# Patient Record
Sex: FEMALE | Race: ASIAN | NOT HISPANIC OR LATINO | Employment: FULL TIME | URBAN - METROPOLITAN AREA
[De-identification: names, ages, dates, MRNs, and addresses within clinical notes are randomized per-mention and may not be internally consistent; named-entity substitution may affect disease eponyms.]

---

## 2021-04-20 ENCOUNTER — COMMUNICATION - HEALTHEAST (OUTPATIENT)
Dept: SCHEDULING | Facility: CLINIC | Age: 51
End: 2021-04-20

## 2021-12-16 ENCOUNTER — OFFICE VISIT (OUTPATIENT)
Dept: FAMILY MEDICINE | Facility: CLINIC | Age: 51
End: 2021-12-16
Payer: COMMERCIAL

## 2021-12-16 VITALS
TEMPERATURE: 97.6 F | SYSTOLIC BLOOD PRESSURE: 144 MMHG | HEART RATE: 69 BPM | DIASTOLIC BLOOD PRESSURE: 84 MMHG | RESPIRATION RATE: 20 BRPM | WEIGHT: 144 LBS | OXYGEN SATURATION: 100 %

## 2021-12-16 DIAGNOSIS — R03.0 ELEVATED BLOOD PRESSURE READING WITHOUT DIAGNOSIS OF HYPERTENSION: ICD-10-CM

## 2021-12-16 DIAGNOSIS — Z83.3 FAMILY HISTORY OF DIABETES MELLITUS: ICD-10-CM

## 2021-12-16 DIAGNOSIS — G56.03 BILATERAL CARPAL TUNNEL SYNDROME: ICD-10-CM

## 2021-12-16 DIAGNOSIS — F32.A DEPRESSION, UNSPECIFIED DEPRESSION TYPE: ICD-10-CM

## 2021-12-16 DIAGNOSIS — M79.2 NEUROPATHIC PAIN OF LEFT HAND: ICD-10-CM

## 2021-12-16 DIAGNOSIS — Z00.00 ENCOUNTER FOR MEDICAL EXAMINATION TO ESTABLISH CARE: ICD-10-CM

## 2021-12-16 DIAGNOSIS — M79.2 NEUROPATHIC PAIN OF RIGHT HAND: Primary | ICD-10-CM

## 2021-12-16 LAB
CHOLEST SERPL-MCNC: 206 MG/DL
FASTING STATUS PATIENT QL REPORTED: ABNORMAL
HBA1C MFR BLD: 5.7 % (ref 0–5.6)
HDLC SERPL-MCNC: 38 MG/DL
LDLC SERPL CALC-MCNC: 118 MG/DL
TRIGL SERPL-MCNC: 249 MG/DL

## 2021-12-16 PROCEDURE — 90682 RIV4 VACC RECOMBINANT DNA IM: CPT | Performed by: STUDENT IN AN ORGANIZED HEALTH CARE EDUCATION/TRAINING PROGRAM

## 2021-12-16 PROCEDURE — 80061 LIPID PANEL: CPT | Performed by: STUDENT IN AN ORGANIZED HEALTH CARE EDUCATION/TRAINING PROGRAM

## 2021-12-16 PROCEDURE — 36415 COLL VENOUS BLD VENIPUNCTURE: CPT | Performed by: STUDENT IN AN ORGANIZED HEALTH CARE EDUCATION/TRAINING PROGRAM

## 2021-12-16 PROCEDURE — 90471 IMMUNIZATION ADMIN: CPT | Performed by: STUDENT IN AN ORGANIZED HEALTH CARE EDUCATION/TRAINING PROGRAM

## 2021-12-16 PROCEDURE — 83036 HEMOGLOBIN GLYCOSYLATED A1C: CPT | Performed by: STUDENT IN AN ORGANIZED HEALTH CARE EDUCATION/TRAINING PROGRAM

## 2021-12-16 PROCEDURE — 99204 OFFICE O/P NEW MOD 45 MIN: CPT | Mod: 25 | Performed by: STUDENT IN AN ORGANIZED HEALTH CARE EDUCATION/TRAINING PROGRAM

## 2021-12-16 RX ORDER — GABAPENTIN 300 MG/1
300 CAPSULE ORAL AT BEDTIME
Qty: 30 CAPSULE | Refills: 0 | Status: SHIPPED | OUTPATIENT
Start: 2021-12-16 | End: 2022-01-12

## 2021-12-16 ASSESSMENT — PATIENT HEALTH QUESTIONNAIRE - PHQ9: SUM OF ALL RESPONSES TO PHQ QUESTIONS 1-9: 17

## 2021-12-16 NOTE — LETTER
RETURN TO WORK/SCHOOL FORM    12/16/2021    Re: Mana Lacy  1970      To Whom It May Concern:     Mana Lacy was seen in clinic today.  She may return to work without restrictions after 12/18/21       Frank Raza MD  12/16/2021 3:02 PM

## 2021-12-16 NOTE — LETTER
December 21, 2021      Mana Lacy  [address] [place] [address]        Dear ,    We are writing to inform you of your test results.    Your lipid levesl are elevated at this time, but not to a point where we should start medication for you.  At this time we would recommend adjusting your diet to cut back on fat content and increase your physical activity to help get these numbers improved.  You also do not have diabetes at this time, though the number is slightly elevated and we should likely check it annually.       We will see you at your follow up, we are happy to answer any questions.        Resulted Orders   Hemoglobin A1c   Result Value Ref Range    Hemoglobin A1C 5.7 (H) 0.0 - 5.6 %      Comment:      Normal <5.7%   Prediabetes 5.7-6.4%    Diabetes 6.5% or higher     Note: Adopted from ADA consensus guidelines.   Lipid Profile   Result Value Ref Range    Cholesterol 206 (H) <=199 mg/dL    Triglycerides 249 (H) <=149 mg/dL    Direct Measure HDL 38 (L) >=50 mg/dL      Comment:      HDL Cholesterol Reference Range:     0-2 years:   No reference ranges established for patients under 2 years old  at [facility] for lipid analytes.    2-8 years:  Greater than 45 mg/dL     18 years and older:   Female: Greater than or equal to 50 mg/dL   Male:   Greater than or equal to 40 mg/dL    LDL Cholesterol Calculated 118 <=129 mg/dL    Patient Fasting > 8hrs? Unknown        If you have any questions or concerns, please call the clinic at the number listed above.       Sincerely,      Frank Raza MD

## 2021-12-16 NOTE — PROGRESS NOTES
Preceptor Attestation:   Patient seen, evaluated and discussed with the resident. I have verified the content of the note, which accurately reflects my assessment of the patient and the plan of care.  Supervising Physician:Frank Clarke MD  [facility]
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
## 2021-12-16 NOTE — LETTER
RETURN TO WORK/SCHOOL FORM    12/16/2021    Re: Mana Lacy  1970      To Whom It May Concern:     Mana Lacy was seen in clinic today.  The patient could likely have a work related injury due to overuse.  She may return to work with restrictions on 12/18/21.  The patient should have limited grasping, should not lift weights more than 10 lbs, and should avoid significant repetitive wrist movements without a break.      Frank Raza MD  12/16/2021 3:20 PM

## 2022-01-12 DIAGNOSIS — M79.2 NEUROPATHIC PAIN OF RIGHT HAND: ICD-10-CM

## 2022-01-12 DIAGNOSIS — M79.2 NEUROPATHIC PAIN OF LEFT HAND: ICD-10-CM

## 2022-01-12 RX ORDER — GABAPENTIN 300 MG/1
300 CAPSULE ORAL AT BEDTIME
Qty: 90 CAPSULE | Refills: 3 | Status: SHIPPED | OUTPATIENT
Start: 2022-01-12 | End: 2022-01-20

## 2022-01-20 ENCOUNTER — OFFICE VISIT (OUTPATIENT)
Dept: FAMILY MEDICINE | Facility: CLINIC | Age: 52
End: 2022-01-20
Payer: COMMERCIAL

## 2022-01-20 VITALS
DIASTOLIC BLOOD PRESSURE: 104 MMHG | TEMPERATURE: 97.6 F | OXYGEN SATURATION: 100 % | WEIGHT: 142 LBS | HEART RATE: 50 BPM | SYSTOLIC BLOOD PRESSURE: 164 MMHG

## 2022-01-20 DIAGNOSIS — I10 BENIGN ESSENTIAL HYPERTENSION: ICD-10-CM

## 2022-01-20 DIAGNOSIS — M79.2 NEUROPATHIC PAIN OF RIGHT HAND: Primary | ICD-10-CM

## 2022-01-20 DIAGNOSIS — H43.393 VITREOUS FLOATERS OF BOTH EYES: ICD-10-CM

## 2022-01-20 DIAGNOSIS — M79.2 NEUROPATHIC PAIN OF LEFT HAND: ICD-10-CM

## 2022-01-20 DIAGNOSIS — R73.03 PRE-DIABETES: ICD-10-CM

## 2022-01-20 LAB
ANION GAP SERPL CALCULATED.3IONS-SCNC: 13 MMOL/L (ref 5–18)
BUN SERPL-MCNC: 13 MG/DL (ref 8–22)
CALCIUM SERPL-MCNC: 9.9 MG/DL (ref 8.5–10.5)
CHLORIDE BLD-SCNC: 103 MMOL/L (ref 98–107)
CO2 SERPL-SCNC: 23 MMOL/L (ref 22–31)
CREAT SERPL-MCNC: 0.75 MG/DL (ref 0.6–1.1)
GFR SERPL CREATININE-BSD FRML MDRD: >90 ML/MIN/1.73M2
GLUCOSE BLD-MCNC: 97 MG/DL (ref 70–125)
POTASSIUM BLD-SCNC: 4.5 MMOL/L (ref 3.5–5)
SODIUM SERPL-SCNC: 139 MMOL/L (ref 136–145)

## 2022-01-20 PROCEDURE — 99214 OFFICE O/P EST MOD 30 MIN: CPT | Mod: GC | Performed by: STUDENT IN AN ORGANIZED HEALTH CARE EDUCATION/TRAINING PROGRAM

## 2022-01-20 PROCEDURE — 36415 COLL VENOUS BLD VENIPUNCTURE: CPT | Performed by: STUDENT IN AN ORGANIZED HEALTH CARE EDUCATION/TRAINING PROGRAM

## 2022-01-20 PROCEDURE — 80048 BASIC METABOLIC PNL TOTAL CA: CPT | Performed by: STUDENT IN AN ORGANIZED HEALTH CARE EDUCATION/TRAINING PROGRAM

## 2022-01-20 RX ORDER — LISINOPRIL 10 MG/1
10 TABLET ORAL DAILY
Qty: 30 TABLET | Refills: 0 | Status: SHIPPED | OUTPATIENT
Start: 2022-01-20 | End: 2022-01-20

## 2022-01-20 RX ORDER — PREGABALIN 25 MG/1
25 CAPSULE ORAL 2 TIMES DAILY
Qty: 60 CAPSULE | Refills: 0 | Status: SHIPPED | OUTPATIENT
Start: 2022-01-20 | End: 2023-03-17

## 2022-01-20 RX ORDER — ACETAMINOPHEN 500 MG
1000 TABLET ORAL EVERY 8 HOURS PRN
COMMUNITY
Start: 2022-01-20 | End: 2023-03-17

## 2022-01-20 NOTE — NURSING NOTE
Due to patient being non-English speaking/uses sign language, an  was used for this visit. Only for face-to-face interpretation by an external agency, date and length of interpretation can be found on the scanned worksheet.     name: Lisa  Agency: [employer]  Language: [place]   Telephone number:   Type of interpretation: Face-to-face, spoken

## 2022-01-20 NOTE — PROGRESS NOTES
"     HPI:       Mana Lacy is an otherwise healthy 51yo F presenting for B/L wrist pain and BP check. Wrist pain has mildly improved since last visit. Attributes this mostly to changing positions at work to accommodate for wrist pain. Tapes wrist at work. Does not use braces at work because she finds them inconvenient. Uses brace when she is at home and sleeping. Dislikes gabapentin because it causes numbness of B/L UE and tongue stiffness/thickness. She stopped taking about 2 wks ago. Denies difficulty breathing and hives when she takes gabapentin. Takes OTC tylenol every 8h for wrist pain and this helps temporarily. Explained she is drinking \"moreno tea\" she buys from the [employer] and this has helped with her pain. She has also tried massage and acupuncture, but this has not improved her pain either.     L thumb feels \"stuck\" sometimes. Has trouble pushing her hair out of her face because of this.     Denies HA, dyspnea, and chest pain. States she has no vision changes, but has had poor eyesight for awhile now despite corrective lenses. She last saw the eye doctor 2 years ago. Has difficulty seeing at night. Explains she sees black spots that look like \"little flies\" in her L eye ever day.     Today she is concerned about her elevated BP and the possibility of having diabetes. Denies changes in urination, changes in appetite, recent wt loss. Unsure if she is eating \"healthily.\" Her youngest son is 8yo and she wants to live long and healthily to be there to raise him.     A [employer]  was used for this visit         PMHX:     There is no problem list on file for this patient.      Current Outpatient Medications   Medication Sig Dispense Refill     acetaminophen (TYLENOL) 500 MG tablet Take 2 tablets (1,000 mg) by mouth every 8 hours as needed for mild pain       pregabalin (LYRICA) 25 MG capsule Take 1 capsule (25 mg) by mouth 2 times daily 60 capsule 0       Social History     Socioeconomic History     " Marital status:      Spouse name: Not on file     Number of children: 7     Years of education: Not on file     Highest education level: Not on file   Occupational History     Occupation:    Tobacco Use     Smoking status: Never Smoker     Smokeless tobacco: Never Used   Substance and Sexual Activity     Alcohol use: Never     Drug use: Never     Sexual activity: Not on file   Other Topics Concern     Not on file   Social History Narrative     Not on file     Social Determinants of Health     Financial Resource Strain: Not on file   Food Insecurity: Not on file   Transportation Needs: Not on file   Physical Activity: Not on file   Stress: Not on file   Social Connections: Not on file   Intimate Partner Violence: Not on file   Housing Stability: Not on file        No Known Allergies    No results found for this or any previous visit (from the past 24 hour(s)).           Physical Exam:     Vitals:    01/20/22 1436 01/20/22 1440   BP: (!) 157/100 (!) 164/104   Pulse: 50    Temp: 97.6  F (36.4  C)    TempSrc: Oral    SpO2: 100%    Weight: 64.4 kg (142 lb)      There is no height or weight on file to calculate BMI.    GENERAL APPEARANCE: sitting comfortably, non-distressed, conversely normally   EYES: Eyes grossly normal to inspection, PERRL and EOM intact.  HEENT: Atraumatic, normocephalic  RESP: Normal WOB, chest symmetrical.   CV: RRR.  MS: thumb opposition intact B/L, flexion and extension at wrist intact B/L, inversion and eversion at wrist intact B/L, pos Phalen test, neg Tinel test, pos shake test   SKIN: no suspicious lesions or rashes  NEURO: B/L UE sensation intact, normal strength and tone, mentation appears intact and speech normal  PSYCH: mood and affect normal      Assessment and Plan     (M79.2) Neuropathic pain of right hand  (primary encounter diagnosis)  (M79.2) Neuropathic pain of left hand  Comment: Mildly improved since last visit with use of splint, changes of activities at  "work, and tylenol use. Endorses intermittent R thumb stiffness. Physical exam notable for positive Phalen and shake test. Thumb opposition intact. ROM of hand at wrist intact. Most likely carpal tunnel syndrome. Possible trigger finger.   - Discontinue gabapentin   - Continue acetaminophen (TYLENOL) 500 MG tablet  - Pregabalin (LYRICA) 25 MG capsule for pain control  - Occupational Therapy Referral for splint fitting    (H43.393) Vitreous floaters of both eyes  Comment: Denies vision changes, but has had poor vision for a long time now. Last eye exam 2 years ago. Unable to see well at night. Endorses seeing \"black flies\" in her L eye intermittently.  Plan: Adult Eye Referral    (I10) Benign essential hypertension  Comment: /100 and 164/104 today. Denies SOB, chest pain, and HA/lightheadedness.   - Basic metabolic panel   - Patient does not want to take anti-hypertensives at this time. Wants to try lifestyle changes first. Counseled on low-sodium diet.   - Discussed possibility of taking anti-hypertensives if lifestyle changes aren't effective in reducing BP. Discussed risks of elevated BP, including stroke.          (R73.03) Pre-diabetes  Comment: 12/16/21 A1C 5.7. Patient concerned about developing diabetes in the future.   - AMB Adult Diabetes Educator Referral to discuss lifestyle and diet changes.  - Counseled patient on healthy eating and light exercise   - Discussed metformin as a prophylactic option. Patient does not want at this time.        Options for treatment and follow-up care were reviewed with the patient and/or guardian. Mana Lacy and/or guardian engaged in the decision making process and verbalized understanding of the options discussed and agreed with the final plan.    Joe Priest, MS3 on 1/20/2022 at 4:09 PM    Precepted today with: Royal Dunn MD    Resident/Fellow Attestation   I, Frank Raza, was present with the medical/[employer] student who participated in the service and in " the documentation of the note.  I have verified the history and personally performed the physical exam and medical decision making.  I agree with the assessment and plan of care as documented in the note.      Frank Raza MD  PGY3

## 2022-01-20 NOTE — LETTER
January 24, 2022      Mana Lacy  [address] [place] [address]        Dear ,    We are writing to inform you of your test results.    Your kidney function is normal at this time.  We will see you back at your next visit.  You are welcome to return sooner with any acute questions or concerns.        Resulted Orders   Basic metabolic panel   Result Value Ref Range    Sodium 139 136 - 145 mmol/L    Potassium 4.5 3.5 - 5.0 mmol/L    Chloride 103 98 - 107 mmol/L    Carbon Dioxide (CO2) 23 22 - 31 mmol/L    Anion Gap 13 5 - 18 mmol/L    Urea Nitrogen 13 8 - 22 mg/dL    Creatinine 0.75 0.60 - 1.10 mg/dL    Calcium 9.9 8.5 - 10.5 mg/dL    Glucose 97 70 - 125 mg/dL    GFR Estimate >90 >60 mL/min/1.73m2      Comment:      Effective December 21, 2021 eGFRcr in adults is calculated using the 2021 CKD-EPI creatinine equation which includes age and gender (Vasu et al., NEJM, DOI: 10.1056/TGRCts0322686)       If you have any questions or concerns, please call the clinic at the number listed above.       Sincerely,      Frank Raza MD

## 2022-01-24 NOTE — PROGRESS NOTES
I have personally reviewed the history and examination as documented by Dr. Raza.  I was present during key portions of the visit and agree with the assessment and plan as documented for 52 yr old female with pre-DM, HTN here for BP check, bilateral wrist pain. Pt will try lifestyle changes before we consider meds for BP. Will send to hand therapy for likely carpal tunnel syndrome. Precautions given. Anticipatory guidance given.     Royal Dunn MD  January 24, 2022  10:42 AM

## 2022-01-26 ENCOUNTER — OFFICE VISIT (OUTPATIENT)
Dept: PHARMACY | Facility: CLINIC | Age: 52
End: 2022-01-26
Payer: COMMERCIAL

## 2022-01-26 DIAGNOSIS — R73.03 PRE-DIABETES: Primary | ICD-10-CM

## 2022-01-26 PROCEDURE — 99207 PR NO CHARGE LOS: CPT | Performed by: PHARMACIST

## 2022-01-26 NOTE — PATIENT INSTRUCTIONS
"Recommendations from today's visit:                                                      1. To help blood pressure decrease the amount of salt, fish sauce, hoisin sauce, oyster sauce you use in cooking  2. You can try lower sodium soy sauce as an option too!  3. When choosing beverages, \"diet\" or \"zero sugar\" options can help our blood sugars      It was great to speak with you today!    I value your experience. You may receive a survey via email or text message in the next few days. I would be very thankful for your time in providing feedback.    Pharmacist's contact information:                                                      Please feel free to contact me with any questions or concerns you have.     Ember Ellis  Pharmacist, Certified Diabetes Care and   [facility]  Phone: [phone]  January 26, 2022 at 3:22 PM            "

## 2022-01-26 NOTE — PROGRESS NOTES
"Clinical Pharmacy Consult:                                                    Mana Lacy is a 52 year old female coming in for a clinical pharmacist consult.  She was referred to me from Dr. Raza.      documentation                            Due to patient being non-English speaking/uses sign language, an  was used for this visit. Only for face-to-face interpretation by an external agency, date and length of interpretation can be found on the scanned worksheet.     name: Gurpreet Haskins-Her  Agency: [employer]  Language: [facility]   Telephone number:-[phone]  Type of interpretation: Face-to-face, spoken                                Reason for Consult: diet counseling for new diagnosis of prediabetes    Lab Results   Component Value Date    A1C 5.7 12/16/2021       Discussion: Questions today about blood pressure, blood cholesterol and blood sugar. Wants to know how to eat to improve her general health.     Plan:  1. To help blood pressure decrease the amount of salt, fish sauce, hoisin sauce, oyster sauce you use in cooking  2. You can try lower sodium soy sauce as an option too!  3. When choosing beverages, \"diet\" or \"zero sugar\" options can help our blood sugars    Ember Ellis, Pharm.D., CDCES [facility]  Phone: [phone]  January 26, 2022 at 2:58 PM  "

## 2022-01-31 NOTE — PATIENT INSTRUCTIONS
Referral for :     Ophthalmology- DM exam    LOCATION/PLACE/Provider :    [facility]   DATE & TIME :    Faxed to location, they will call patient   PHONE :     [phone]  FAX :    [phone]  Appointment made by clinic staff/:    Kaylie

## 2023-02-27 ENCOUNTER — TELEPHONE (OUTPATIENT)
Dept: FAMILY MEDICINE | Facility: CLINIC | Age: 53
End: 2023-02-27
Payer: COMMERCIAL

## 2023-02-27 NOTE — TELEPHONE ENCOUNTER
Called patient, no answer. If patient calls back please help schedule for a blood pressure asap with PCP per Ember. Thank you

## 2023-03-17 ENCOUNTER — OFFICE VISIT (OUTPATIENT)
Dept: FAMILY MEDICINE | Facility: CLINIC | Age: 53
End: 2023-03-17
Payer: COMMERCIAL

## 2023-03-17 VITALS
HEART RATE: 51 BPM | BODY MASS INDEX: 24.74 KG/M2 | WEIGHT: 126 LBS | OXYGEN SATURATION: 98 % | TEMPERATURE: 97.9 F | HEIGHT: 60 IN | DIASTOLIC BLOOD PRESSURE: 69 MMHG | SYSTOLIC BLOOD PRESSURE: 117 MMHG | RESPIRATION RATE: 20 BRPM

## 2023-03-17 DIAGNOSIS — Z12.4 CERVICAL CANCER SCREENING: ICD-10-CM

## 2023-03-17 DIAGNOSIS — Z00.00 ROUTINE GENERAL MEDICAL EXAMINATION AT A HEALTH CARE FACILITY: Primary | ICD-10-CM

## 2023-03-17 DIAGNOSIS — Z11.59 NEED FOR HEPATITIS C SCREENING TEST: ICD-10-CM

## 2023-03-17 DIAGNOSIS — E78.2 MIXED HYPERLIPIDEMIA: ICD-10-CM

## 2023-03-17 DIAGNOSIS — R73.03 PRE-DIABETES: ICD-10-CM

## 2023-03-17 DIAGNOSIS — Z11.4 SCREENING FOR HIV (HUMAN IMMUNODEFICIENCY VIRUS): ICD-10-CM

## 2023-03-17 DIAGNOSIS — Z12.31 ENCOUNTER FOR SCREENING MAMMOGRAM FOR BREAST CANCER: ICD-10-CM

## 2023-03-17 PROBLEM — I10 BENIGN ESSENTIAL HYPERTENSION: Status: RESOLVED | Noted: 2022-01-20 | Resolved: 2023-03-17

## 2023-03-17 LAB
ANION GAP SERPL CALCULATED.3IONS-SCNC: 13 MMOL/L (ref 7–15)
BUN SERPL-MCNC: 15.5 MG/DL (ref 6–20)
CALCIUM SERPL-MCNC: 9.9 MG/DL (ref 8.6–10)
CHLORIDE SERPL-SCNC: 106 MMOL/L (ref 98–107)
CHOLEST SERPL-MCNC: 205 MG/DL
CREAT SERPL-MCNC: 0.76 MG/DL (ref 0.51–0.95)
DEPRECATED HCO3 PLAS-SCNC: 22 MMOL/L (ref 22–29)
GFR SERPL CREATININE-BSD FRML MDRD: >90 ML/MIN/1.73M2
GLUCOSE SERPL-MCNC: 87 MG/DL (ref 70–99)
HBA1C MFR BLD: 5.6 % (ref 0–5.6)
HDLC SERPL-MCNC: 48 MG/DL
LDLC SERPL CALC-MCNC: 147 MG/DL
NONHDLC SERPL-MCNC: 157 MG/DL
POTASSIUM SERPL-SCNC: 4.5 MMOL/L (ref 3.4–5.3)
SODIUM SERPL-SCNC: 141 MMOL/L (ref 136–145)
TRIGL SERPL-MCNC: 48 MG/DL

## 2023-03-17 PROCEDURE — 87624 HPV HI-RISK TYP POOLED RSLT: CPT

## 2023-03-17 PROCEDURE — 99396 PREV VISIT EST AGE 40-64: CPT | Mod: GC

## 2023-03-17 PROCEDURE — 83036 HEMOGLOBIN GLYCOSYLATED A1C: CPT

## 2023-03-17 PROCEDURE — 36415 COLL VENOUS BLD VENIPUNCTURE: CPT

## 2023-03-17 PROCEDURE — 87389 HIV-1 AG W/HIV-1&-2 AB AG IA: CPT

## 2023-03-17 PROCEDURE — 80061 LIPID PANEL: CPT

## 2023-03-17 PROCEDURE — 86803 HEPATITIS C AB TEST: CPT

## 2023-03-17 PROCEDURE — 80048 BASIC METABOLIC PNL TOTAL CA: CPT

## 2023-03-17 PROCEDURE — G0145 SCR C/V CYTO,THINLAYER,RESCR: HCPCS

## 2023-03-17 ASSESSMENT — ENCOUNTER SYMPTOMS
NERVOUS/ANXIOUS: 0
JOINT SWELLING: 1
SORE THROAT: 0
PALPITATIONS: 0
CONSTIPATION: 0
ABDOMINAL PAIN: 0
HEARTBURN: 0
HEMATURIA: 0
ARTHRALGIAS: 0
COUGH: 0
DYSURIA: 0
HEMATOCHEZIA: 0
PARESTHESIAS: 0
DIZZINESS: 0
MYALGIAS: 0
BREAST MASS: 0
HEADACHES: 0
NAUSEA: 0
DIARRHEA: 0
SHORTNESS OF BREATH: 0
CHILLS: 0
EYE PAIN: 0
FEVER: 0
FREQUENCY: 0
WEAKNESS: 0

## 2023-03-17 NOTE — PROGRESS NOTES
I have personally reviewed the history and examination as documented by Dr. Armas.  I was present during key portions of the visit and agree with the assessment and plan as documented for 53 yr old female with pre-DM here for pap, health maintenance and labwork. Pt declined colonoscopy but agreed to mammogram. Precautions given. Anticipatory guidance given.     Royal Dunn MD  March 17, 2023  9:08 AM

## 2023-03-17 NOTE — PROGRESS NOTES
SUBJECTIVE:   CC: Mana is an 53 year old who presents for preventive health visit.   Patient has been advised of split billing requirements and indicates understanding: Yes  HPI    Health care changes since last visit:  - No concerns today  - Was seen for right wrist pain last year; has changed some of the roles at her job and incorporated massage, which she thinks helped  - Any new health concerns: No   - Any new hospitalizations:  No   - Any new surgeries: No   - Any medication changes: Not currently on any medications     Lifestyle:  - Diet: Eats consistent meals, eats vegetables in most meals. Eats typical [employer] diet.   - Exercise: Enrolled in a gym.   - Alcohol: No  - Tobacco: No   - Drug use: No   - Periods: stopped completely two years ago, has not experienced any bleeding since that time.     Health Maintenance/Preventitive Measures:  - Vaccinations: Declines today   - Colon cancer screening: Declines today; will think about it more next time.   - Breast cancer screening: Accepts screening, will be first mammogram.  - Pap smear: Patient has not had a mammogram since the birth of her last child. Accepts screening today    Laboratory Tests:  - Cholesterol: was elevated two years ago - will retest today   - Glucose/A1c: Was 5.7 two years ago - will retest today   - Agrees to screening HIV and Hepatitis C test    Today's PHQ-2 Score:   PHQ-2 ( 1999 Pfizer) 3/17/2023   Q1: Little interest or pleasure in doing things 0   Q2: Feeling down, depressed or hopeless 0   PHQ-2 Score 0   Q1: Little interest or pleasure in doing things Not at all   Q2: Feeling down, depressed or hopeless Not at all   PHQ-2 Score 0     Social History     Tobacco Use     Smoking status: Never     Smokeless tobacco: Never   Substance Use Topics     Alcohol use: Never     Alcohol Use 3/17/2023   Prescreen: >3 drinks/day or >7 drinks/week? No       Past Medical History:   Diagnosis Date     FH: cholecystectomy       No past surgical history on  file.    Review of Systems   Constitutional: Negative for chills and fever.   HENT: Negative for congestion, ear pain, hearing loss and sore throat.    Eyes: Negative for pain and visual disturbance.   Respiratory: Negative for cough and shortness of breath.    Cardiovascular: Negative for chest pain, palpitations and peripheral edema.   Gastrointestinal: Negative for abdominal pain, constipation, diarrhea, heartburn, hematochezia and nausea.   Breasts:  Negative for tenderness, breast mass and discharge.   Genitourinary: Negative for dysuria, frequency, genital sores, hematuria, pelvic pain, urgency, vaginal bleeding and vaginal discharge.   Musculoskeletal: Positive for joint swelling. Negative for arthralgias and myalgias.   Skin: Negative for rash.   Neurological: Negative for dizziness, weakness, headaches and paresthesias.   Psychiatric/Behavioral: Negative for mood changes. The patient is not nervous/anxious.      OBJECTIVE:   /69   Pulse 51   Temp 97.9  F (36.6  C)   Resp 20   Ht 1.524 m (5')   Wt 57.2 kg (126 lb)   SpO2 98%   BMI 24.61 kg/m    Physical Exam  GENERAL: healthy, alert and no distress  EYES: Eyes grossly normal to inspection, PERRL and conjunctivae and sclerae normal  HENT: ear canals and TM's normal, nose and mouth without ulcers or lesions  NECK: no adenopathy, no asymmetry, masses, or scars and thyroid normal to palpation  RESP: lungs clear to auscultation - no rales, rhonchi or wheezes  CV: regular rate and rhythm, normal S1 S2, no S3 or S4, no murmur, click or rub, no peripheral edema and peripheral pulses strong  ABDOMEN: soft, nontender, no hepatosplenomegaly, no masses and bowel sounds normal   (female): normal female external genitalia, normal urethral meatus, vaginal mucosa pink, moist, well rugated, and normal cervix/adnexa/uterus without masses or discharge  MS: no gross musculoskeletal defects noted, no edema  SKIN: no suspicious lesions or rashes  NEURO: Normal  strength and tone, mentation intact and speech normal  PSYCH: mentation appears normal, affect normal/bright    ASSESSMENT/PLAN:     Routine general medical examination at a health care facility  (primary encounter diagnosis)  Comment: No concerns today. No recent changes to health, not currently on any medications.   Plan: Routine visit in one year.     Pre-diabetes  Mixed hyperlipidemia  Comment: A1c was 5.7 in 2021. Not currently on any medications. Has started exercising more, feels like her diet is balanced and relatively healthy.   Plan:   - Hemoglobin A1c  - Basic metabolic panel  - Lipid panel    Screening for HIV (human immunodeficiency virus)  Comment: Agrees to routine screening.   Plan: HIV Screening          Need for hepatitis C screening test  Comment: Agrees to routine screening.   Plan: Hepatitis C Screen Reflex to HCV RNA Quant and Genotype    Encounter for screening mammogram for breast cancer  Comment: Agrees to routine screening.   Plan: MA SCREENING DIGITAL BILAT    Cervical cancer screening  Comment: Patient has never had an abnormal pap. Last exam was over 10 years ago. Agrees to routine screening today.   Plan: Gynecologic Cytology (PAP)        COUNSELING:  Reviewed preventive health counseling, as reflected in patient instructions  Special attention given to:        Regular exercise       Healthy diet/nutrition       Alcohol Use       Osteoporosis prevention/bone health       Colorectal Cancer Screening       Consider Hep C screening for all patients one time for ages 18-79 years       HIV screeninx in teen years, 1x in adult years, and at intervals if high risk       (Hannah)menopause management    She reports that she has never smoked. She has never used smokeless tobacco.    Lola Taveras MD  [facility]

## 2023-03-17 NOTE — LETTER
March 28, 2023      Mana Lacy  [address] [place] [address]        Dear ,    We are writing to inform you of your test results.    At your physical, we obtained routine screening lab.     1. Your cholesterol came back elevated, but is not so elevated that we need to start a medication to treat this. We will continue to monitor your cholesterol levels. Please let us known if you have more questions on this.     2.  Your HIV and hepatitis C screening returned as negative, meaning you do not have either of these diseases.     3. Your electrolytes and kidney function were normal. Your diabetes test also came back normal.     4. Your pap smear came back normal. You will be due for your next in five years.     Lola Taveras MD        Resulted Orders   Gynecologic Cytology (PAP)   Result Value Ref Range    Interpretation        Negative for Intraepithelial Lesion or Malignancy (NILM)    Comment         Papanicolaou Test Limitations:  Cervical cytology is a screening test with limited sensitivity, and regular screening is critical for cancer prevention.  Pap tests are primarily effective for the diagnosis/prevention of squamous cell carcinoma, not adenocarcinoma or other cancers.        Specimen Adequacy       Satisfactory for evaluation, endocervical/transformation zone component present    Clinical Information       none      Reflex Testing Yes regardless of result     Previous Abnormal?       No      Performing Labs       The technical component of this testing was completed at [facility]     HIV Screening   Result Value Ref Range    HIV Antigen Antibody Combo Nonreactive Nonreactive      Comment:      HIV-1 p24 Ag & HIV-1/HIV-2 Ab Not Detected   Hepatitis C Screen Reflex to HCV RNA Quant and Genotype   Result Value Ref Range    Hepatitis C Antibody Nonreactive Nonreactive    Narrative    Assay performance characteristics have not been  established for newborns, infants, and children.   Lipid panel   Result Value Ref Range    Cholesterol 205 (H) <200 mg/dL    Triglycerides 48 <150 mg/dL    Direct Measure HDL 48 (L) >=50 mg/dL    LDL Cholesterol Calculated 147 (H) <=100 mg/dL    Non HDL Cholesterol 157 (H) <130 mg/dL    Narrative    Cholesterol  Desirable:  <200 mg/dL    Triglycerides  Normal:  Less than 150 mg/dL  Borderline High:  150-199 mg/dL  High:  200-499 mg/dL  Very High:  Greater than or equal to 500 mg/dL    Direct Measure HDL  Female:  Greater than or equal to 50 mg/dL   Male:  Greater than or equal to 40 mg/dL    LDL Cholesterol  Desirable:  <100mg/dL  Above Desirable:  100-129 mg/dL   Borderline High:  130-159 mg/dL   High:  160-189 mg/dL   Very High:  >= 190 mg/dL    Non HDL Cholesterol  Desirable:  130 mg/dL  Above Desirable:  130-159 mg/dL  Borderline High:  160-189 mg/dL  High:  190-219 mg/dL  Very High:  Greater than or equal to 220 mg/dL   Hemoglobin A1c   Result Value Ref Range    Hemoglobin A1C 5.6 0.0 - 5.6 %      Comment:      Normal <5.7%   Prediabetes 5.7-6.4%    Diabetes 6.5% or higher     Note: Adopted from ADA consensus guidelines.   Basic metabolic panel   Result Value Ref Range    Sodium 141 136 - 145 mmol/L    Potassium 4.5 3.4 - 5.3 mmol/L    Chloride 106 98 - 107 mmol/L    Carbon Dioxide (CO2) 22 22 - 29 mmol/L    Anion Gap 13 7 - 15 mmol/L    Urea Nitrogen 15.5 6.0 - 20.0 mg/dL    Creatinine 0.76 0.51 - 0.95 mg/dL    Calcium 9.9 8.6 - 10.0 mg/dL    Glucose 87 70 - 99 mg/dL    GFR Estimate >90 >60 mL/min/1.73m2      Comment:      eGFR calculated using 2021 CKD-EPI equation.   HPV High Risk Types DNA Cervical   Result Value Ref Range    Other HR HPV Negative Negative    HPV16 DNA Negative Negative    HPV18 DNA Negative Negative    FINAL DIAGNOSIS       This patient's sample is negative for HPV DNA.        This test was developed and its performance characteristics determined by the [facility]. It has not been cleared or approved by the FDA. The laboratory is regulated under CLIA as qualified to perform high-complexity testing. This test is used for clinical purposes. It should not be regarded as investigational or for research.    METHODOLOGY: The Roche Marley 4800 system uses automated extraction, simultaneous amplification of HPV (L1 region) and beta-globin, followed by real time detection of fluorescent labeled HPV and beta globin using specific oligonucleotide probes. The test specifically identifies types HPV 16 DNA and HPV 18 DNA while concurrently detecting the rest of the high risk types (31, 33, 35, 39, 45, 51, 52, 56, 58, 59, 66 or 68).    COMMENTS: This test is not intended for use as a screening device for woman under age 30 with normal cervical cytology. Results should be correlated with cytologic and histologic findings. Close clinical followup is recommended.           If you have any questions or concerns, please call the clinic at the number listed above.       Sincerely,      Royal Dunn MD

## 2023-03-18 LAB
HCV AB SERPL QL IA: NONREACTIVE
HIV 1+2 AB+HIV1 P24 AG SERPL QL IA: NONREACTIVE

## 2023-03-22 LAB
BKR LAB AP GYN ADEQUACY: NORMAL
BKR LAB AP GYN INTERPRETATION: NORMAL
BKR LAB AP HPV REFLEX: NORMAL
BKR LAB AP PREVIOUS ABNORMAL: NORMAL
PATH REPORT.COMMENTS IMP SPEC: NORMAL
PATH REPORT.COMMENTS IMP SPEC: NORMAL
PATH REPORT.RELEVANT HX SPEC: NORMAL

## 2023-03-24 LAB
HUMAN PAPILLOMA VIRUS 16 DNA: NEGATIVE
HUMAN PAPILLOMA VIRUS 18 DNA: NEGATIVE
HUMAN PAPILLOMA VIRUS FINAL DIAGNOSIS: NORMAL
HUMAN PAPILLOMA VIRUS OTHER HR: NEGATIVE

## 2025-05-08 ENCOUNTER — OFFICE VISIT (OUTPATIENT)
Dept: FAMILY MEDICINE | Facility: CLINIC | Age: 55
End: 2025-05-08
Payer: COMMERCIAL

## 2025-05-08 ENCOUNTER — TELEPHONE (OUTPATIENT)
Dept: FAMILY MEDICINE | Facility: CLINIC | Age: 55
End: 2025-05-08

## 2025-05-08 VITALS
BODY MASS INDEX: 25.91 KG/M2 | HEIGHT: 60 IN | HEART RATE: 59 BPM | OXYGEN SATURATION: 97 % | SYSTOLIC BLOOD PRESSURE: 137 MMHG | DIASTOLIC BLOOD PRESSURE: 82 MMHG | TEMPERATURE: 98.6 F | RESPIRATION RATE: 19 BRPM | WEIGHT: 132 LBS

## 2025-05-08 DIAGNOSIS — Z00.00 ROUTINE GENERAL MEDICAL EXAMINATION AT A HEALTH CARE FACILITY: Primary | ICD-10-CM

## 2025-05-08 DIAGNOSIS — Z12.31 ENCOUNTER FOR SCREENING MAMMOGRAM FOR BREAST CANCER: ICD-10-CM

## 2025-05-08 DIAGNOSIS — Z23 NEED FOR DIPHTHERIA-TETANUS-PERTUSSIS (TDAP) VACCINE: ICD-10-CM

## 2025-05-08 DIAGNOSIS — Z12.11 SCREEN FOR COLON CANCER: ICD-10-CM

## 2025-05-08 DIAGNOSIS — R73.03 PRE-DIABETES: ICD-10-CM

## 2025-05-08 DIAGNOSIS — Z23 NEED FOR PNEUMOCOCCAL VACCINATION: ICD-10-CM

## 2025-05-08 PROBLEM — M79.2 NEUROPATHIC PAIN OF RIGHT HAND: Status: RESOLVED | Noted: 2022-01-20 | Resolved: 2025-05-08

## 2025-05-08 PROBLEM — M79.2 NEUROPATHIC PAIN OF LEFT HAND: Status: RESOLVED | Noted: 2022-01-20 | Resolved: 2025-05-08

## 2025-05-08 LAB
EST. AVERAGE GLUCOSE BLD GHB EST-MCNC: 126 MG/DL
HBA1C MFR BLD: 6 % (ref 0–5.6)

## 2025-05-08 SDOH — HEALTH STABILITY: PHYSICAL HEALTH: ON AVERAGE, HOW MANY MINUTES DO YOU ENGAGE IN EXERCISE AT THIS LEVEL?: 30 MIN

## 2025-05-08 SDOH — HEALTH STABILITY: PHYSICAL HEALTH: ON AVERAGE, HOW MANY DAYS PER WEEK DO YOU ENGAGE IN MODERATE TO STRENUOUS EXERCISE (LIKE A BRISK WALK)?: 3 DAYS

## 2025-05-08 ASSESSMENT — SOCIAL DETERMINANTS OF HEALTH (SDOH): HOW OFTEN DO YOU GET TOGETHER WITH FRIENDS OR RELATIVES?: ONCE A WEEK

## 2025-05-08 NOTE — PROGRESS NOTES
Faculty Supervision of Residents   I have examined this patient and the medical care has been evaluated and discussed with the resident. See resident note outlining our discussion. Reviewed all USPSTF A and B recs    Bee Salcedo MD

## 2025-05-08 NOTE — PROGRESS NOTES
Preventive Care Visit  [facility]  Lora Bynum MD, Family Medicine  May 8, 2025      Assessment & Plan     Routine general medical examination at a health care facility  55 year old otherwise healthy female who presents for yearly physical. No new concerns and not taking any medications. Discussed healthy lifestyle recommendations including diet and exercise goals.     Pre-diabetes  A1c was 5.7 3 years ago, was 5.6 in 2023. Will repeat screening today.   - Hemoglobin A1c    Encounter for screening mammogram for breast cancer  - MA Screening Bilateral w/ Avi    Screen for colon cancer  Discussed options and agreeable to FIT testing.   - Fecal colorectal cancer screen FIT    Need for diphtheria-tetanus-pertussis (Tdap) vaccine  - TDAP 10-64Y (ADACEL,BOOSTRIX)    Need for pneumococcal vaccination  - Pneumococcal 20 Valent Conjugate (PCV20)    Encouraged to get Shingles vaccine at Pharmacy.     Will try to obtain Hepatitis B records. If unable to locate prior lab immunity or vaccination will plan to order hepatitis B screening panel prior to vaccination.     BMI  Estimated body mass index is 25.78 kg/m  as calculated from the following:    Height as of this encounter: 1.524 m (5').    Weight as of this encounter: 59.9 kg (132 lb).       Counseling  Appropriate preventive services were addressed with this patient via screening, questionnaire, or discussion as appropriate for fall prevention, nutrition, physical activity, Tobacco-use cessation, social engagement, weight loss and cognition.  Checklist reviewing preventive services available has been given to the patient.  Reviewed patient's diet, addressing concerns and/or questions.   She is at risk for lack of exercise and has been provided with information to increase physical activity for the benefit of her well-being.     Follow-up  Return in about 53 weeks (around 5/14/2026) for Annual Wellness Visit.    Subjective   Mana is a 55 year  old, presenting for the following:  Physical        5/8/2025     2:13 PM   Additional Questions   Roomed by Jason   Accompanied by KRISTIN         5/8/2025    Information    services provided? Yes   Language Hmong   Type of interpretation provided Face-to-face    name Lisa    ID KRISTIN    Ag[employer]    phone number [phone]      HPI    Concerns:  1) About a year of intermittent numbness in the left fourth toe. Initially thought was related to socks/shoes she was wearing.      Advance Care Planning    Discussed advance care planning with patient; informed AVS has link to [employer].        5/8/2025   General Health   How would you rate your overall physical health? Good   Feel stress (tense, anxious, or unable to sleep) Only a little   (!) STRESS CONCERN        5/8/2025   Nutrition   Three or more servings of calcium each day? Yes   Diet: Regular (no restrictions)   How many servings of fruit and vegetables per day? (!) 0-1   How many sweetened beverages each day? 0-1      Drinks mostly water. Eats traditional  diet.          5/8/2025   Exercise   Days per week of moderate/strenous exercise 3 days   Average minutes spent exercising at this level 30 min     Walking and works in garden.         5/8/2025   Social Factors   Frequency of gathering with friends or relatives Once a week   Worry food won't last until get money to buy more No   Food not last or not have enough money for food? Yes   Do you have housing? (Housing is defined as stable permanent housing and does not include staying outside in a car, in a tent, in an abandoned building, in an overnight shelter, or couch-surfing.) Yes   Are you worried about losing your housing? Yes   Lack of transportation? Yes   Unable to get utilities (heat,electricity)? Yes   Want help with housing or utility concern? No   (!) FOOD SECURITY CONCERN PRESENT (!) TRANSPORTATION CONCERN PRESENT(!) HOUSING  CONCERN PRESENT(!) FINANCIAL RESOURCE STRAIN CONCERN    Right now feels things are okay but worried about changes with the current administration. Worries about the cost of food rising without income increase. Discussed and offered support through care coordination/SW if desired. She will reach out if needed.         5/8/2025   Fall Risk   Fallen 2 or more times in the past year? No   Trouble with walking or balance? No          5/8/2025   Dental   Dentist two times every year? Yes     Today's PHQ-2 Score:       5/8/2025     2:07 PM   PHQ-2 ( 1999 Pfizer)   Q1: Little interest or pleasure in doing things 0   Q2: Feeling down, depressed or hopeless 0   PHQ-2 Score 0    Q1: Little interest or pleasure in doing things Not at all   Q2: Feeling down, depressed or hopeless Not at all   PHQ-2 Score 0       Patient-reported     Not feeling overly anxious right now.  had an eye issue so is not working currently which worries her though she is hopeful he will return to work.         5/8/2025   Substance Use   Alcohol more than 3/day or more than 7/wk No   Do you use any other substances recreationally? No     Social History     Tobacco Use    Smoking status: Never     Passive exposure: Never    Smokeless tobacco: Never   Substance Use Topics    Alcohol use: Never    Drug use: Never          Mammogram Screening - Mammogram every 1-2 years updated in Health Maintenance based on mutual decision making        5/8/2025   STI Screening   New sexual partner(s) since last STI/HIV test? No     Last period a few years ago during the pandemic. Menopausal.   One male partner. No concern for STIs.     History of abnormal Pap smear: No - age 30-64 HPV with reflex Pap every 5 years recommended        Latest Ref Rng & Units 3/17/2023     9:14 AM   PAP / HPV   PAP  Negative for Intraepithelial Lesion or Malignancy (NILM)    HPV 16 DNA Negative Negative    HPV 18 DNA Negative Negative    Other HR HPV Negative Negative      ASCVD Risk    The 10-year ASCVD risk score (Radha FAITH, et al., 2019) is: 2.9%    Values used to calculate the score:      Age: 55 years      Sex: Female      Is Non- : No      Diabetic: No      Tobacco smoker: No      Systolic Blood Pressure: 137 mmHg      Is BP treated: No      HDL Cholesterol: 48 mg/dL      Total Cholesterol: 205 mg/dL           Reviewed and updated as needed this visit by Provider   Tobacco  Allergies  Meds  Problems  Med Hx  Surg Hx  Fam Hx  Soc   Hx Sexual Activity                 Objective    Exam  /82   Pulse 59   Temp 98.6  F (37  C)   Resp 19   Ht 1.524 m (5')   Wt 59.9 kg (132 lb)   SpO2 97%   BMI 25.78 kg/m     Estimated body mass index is 25.78 kg/m  as calculated from the following:    Height as of this encounter: 1.524 m (5').    Weight as of this encounter: 59.9 kg (132 lb).    Physical Exam  GENERAL: alert and no distress  EYES: Eyes grossly normal to inspection, PERRL and conjunctivae and sclerae normal  HENT: Left ear canal and TM normal, right with small erythematous lesion; nose and mouth without ulcers or lesions  NECK: no adenopathy, no asymmetry, masses, or scars  RESP: lungs clear to auscultation - no rales, rhonchi or wheezes  CV: regular rate and rhythm, normal S1 S2, no S3 or S4, no murmur, click or rub, no peripheral edema  ABDOMEN: soft, nontender, no hepatosplenomegaly, no masses and bowel sounds normal  MS: no gross musculoskeletal defects noted, no edema  SKIN: no suspicious lesions or rashes  NEURO: Normal strength and tone, mentation intact and speech normal  PSYCH: mentation appears normal, affect normal/bright    Foot exam: normal DP and PT pulses, no trophic changes or ulcerative lesions, normal sensory exam (temperature, proprioception, light touch), normal monofilament exam.     Signed Electronically by: Lora Bynum MD

## 2025-05-08 NOTE — PATIENT INSTRUCTIONS
"Patient Education   Amelia Amparo Xeeb Saib Xyuas Kom Tiv Thaiv Tus Kheej  Nov yog ib co amelia amparo xeeb uas peb yeej meem muab ashley tib neeg pab lawv noj qab nyob zoo. Jaci zaum koj pab pawg saib xyuas yuav muaj ib co amelia amparo xeeb uas tshwj xeeb ashley koj nkaus xwb. Thov nrog koj pab pawg saib xyuas sib rishi txog jaci yam uas koj toob ariana kom tiv thaiv koj tus kheej.  Tanmay Coj Lub Neej  Es xaws xais ntau tami 150 feeb txhua lub schaefer tiam (30 feeb txhua hnub, 5 hnub ib lub schaefer tiam).  Ua yam pab cov leeg muaj zog tuaj 2 zaug txhua lub schaefer tiam. Jaci yam no pab koj tswj hwm koj lub cev qhov hnyav thiab tiv thaiv ntawm kab mob.  Txhob haus luam yeeb.  Pleev tshuaj tiv thaiv tshav kub kom thiaj tsis raug mob khees xaws ntawm nqaij tawv.  Txhua 2 mus ashley 4 xyoos yuav tau kuaj koj lub tsev ashley qhov radon. Radon yog ib james kobe uas tsis muaj xim tsis muaj ntxhiab uas mob tau koj cov ntsws. Kom thiaj kawm ntxiv, mus saib [email or web address] thiab ntaus ntawv \"Radon in Homes.\"  Ceev cov phom kom tsis muaj mos txwv ashley hauv thiab muab xauv capri hauv ib qho chaw nyab xeeb xws li lub txhoj, los yog muab xauv capri thiab muab cov yawm sij zais capri. Muab cov mos txwv xauv ashley hauv lwm qhov chaw nrug cov phom. Kom thiaj kawm ntxiv, mus saib [email or web address] thiab ntaus ntawv \"safe gun storage.\"  Tanmay Noj Qab Huv  Noj 5 qho txiv hmab txiv ntoo thiab zaub txhua hnub.  Noj nplem nplej, txhuv xim av thiab cov fawm muaj nplej (los theej nplem dawb, txhuv dawb, thiab fawm dawb).  Ua tib zoo noj calcium thiab vitamin D ntau. Saib cov ntawv lo ashley pob khoom noj thiab siv zog noj kom txog 100% RDA (qhov ntau hauv ib hnub).  Yeej meem kuaj ntsuas  Txhua 6 lub hli mus kuaj hniav thiab muab tu.  Txhua xyoo mus saib koj pab pawg saib xyuas tanmay noj qab nyob zoo kom sib rishi txog:  Ib yam dab tsi uas hloov ntawm koj txoj tanmay noj qab nyob zoo.  Cov tshuaj uas koj pab pawg tau hais kom siv.  Tanmay saib xyuas kom tiv thaiv, tanmay npaj ashley tsev neeg, thiab yuav ua li harlan tiv " thaiv ntawm kab mob uas ntev.  Tanmay txhaj tshuaj (koob tshuaj tiv thaiv)   Cov koob tshuaj HPV (txog thaum muaj 25 xyoo), yog koj yeej tsis tau txais tami li.  Cov koob tshuaj Hepatitis B Kab Mob Siab (txog thaum muaj 58 xyoos), yog koj yeej tsis tau txais tmai li.  Koob tshuaj COVID-19: Txais koob tshuaj no thaum txog caij txais.  Koob tshuaj tiv thaiv ntawm mob khaub thuas: Txais txhua xyoo.  Koob tshuaj tiv thaiv mob daig tsaig: Txais txhua 10 xyoo.  Pneumococcal, hepatitis A, thiab RSV cov koob tshuaj: Nug koj pab pawg saib xyuas devonte puas tsim nyog ashley koj txais cov no raws li koj qhov pheej hmoo.  Koob tshuaj tiv thaiv ntawm kab mob sawv hlwv (ashley cov muaj 49 xyoo rov jolanta).  Tanmay kuaj ntsuas ashley tanmay noj qab nyob zoo  Kuaj mob ntshav qab zib:  Pib thaum muaj 34 xyoos, Mus kuaj mob ntshav qab zib txhua 2 xyoos los yog ntau zog.  Yog koj tseem tsis tau txog 34 xyoos, nug koj pab pawg saib xyuas devonte puas tsim nyog ashley koj kuaj mob ntshav qab zib.  Kuaj cholesterol: Thaum muaj 38 xyoo, pib kuaj cholesterol txhua 4 xyoos, los yog ntau tami ntawd yog kws mckenna mob qhia.  Kuaj txha qhov tuab (DEXA): Thaum txog 49 xyoo lawd, nug koj pab pawg saib xyuas devonte puas tsim nyog ashley koj kuaj txha devonte puas ruaj.  Kab Mob Siab Hepatitis C: Kuaj ib zaug hauv koj lub neej.  Kuaj Txoj Hlab Ntshav Hauv Plab: Nrog koj tus kws mckenna mob rishi txog tanmay kuaj ntsuas no yog koj:  Tau haus luam yeeb ib zaug li; thiab  Yog txiv neej; thiab  Nruab hnub nyoog 64 thiab 74.  Mob Ariana Cees (kis mob dhau ntawm tanmay sib deev)  Ua ntej muaj 23 xyoos: Nug koj pab pawg saib xyuas devonte puas tsim nyog kuaj ashley mob ariana cees.  Rich qab muaj 23 xyoos: Mus kuaj ashley mob ariana cees yog koj muaj tanmay pheej hmoo. Koj muaj tanmay pheej hmoo ashley mob ariana cees (suav nrog HIV) yog:  Koj sib deev nrog ntau tami ib tug neeg.  Koj tsis siv cov hnab looj qau thaum sib deev.  Koj los yog koj tus khub deev kuaj pom tias muaj mob ariana cees lawm.  Yog koj muaj tanmay pheej hmoo ashley mob ariana cees  HIV, nug txog cov tshuaj PrEP kom tiv thaiv ntawm HIV.  Mus kuaj ashley mob ariana cees HIV yam ntau tami ib zaug hauv koj lub neej, txawm yog koj muaj tanmay pheej hmoo ashley mob ariana cees HIV los tsis muaj los xij.  Kuaj ashley mob khees xaws  Kuaj lub ncauj tsev me nyuam ashley mob khees xaws: Yog koj muaj ib lub ncauj tsev me nyuam, marixa yuav tau ib sij kuaj lub ncauj tsev me nyuam seb puas muaj mob khees xaws pib thaum muaj 20 xyoos. Neeg feem coob uas ib sij kuaj lub ncauj tsev me nyuam thiab tsis pom dab tsi txawv lawv tsum tau jorge qab muaj 64 xyoos. Nrog koj tus kws mckenna mob sib rishi txog qhov no.  Kuaj lub mis ashley mob khees xaws (mammogram): Yog koj tau muaj mis tami li, yuav tau ib sij kuaj lub mis pib thaum muaj 39 xyoo. Tanmay kuaj no yog kom saib seb puas muaj mob khees xaws hauv lub mis.  Kuaj Txoj Hnyuv Ashley Mob Khees Xaws: Yeej tseem ceeb pib kuaj txoj hnyuv ashley mob khees xaws pib thaum muaj 44 xyoos.  Txhua 9 xyoo yuav tau kuaj txoj hnyuv (los yog ntau zog yog koj muaj tanmay pheej hmoo) Los sis, nug koj tus kws mckenna mob txog tanmay kuaj thooj quav FIT txhua xyoo los yog Cologuard txhua 2 xyoos.  Kom thiaj kawm ntxiv txog jaci james kuaj no, mus saib: [email or web address].  Yog xav tau tanmay pab txog qhov no, mus saib: [email or web address].[email or web address].  Kuaj lub david kua phev (prostate) ashley mob khees xaws: Yog koj muaj ib lub david kua phev (prostate) thiab nruab hnub nyoog 54 mus ashley 68, nug koj tus kws mckenna mob devonte puas tsim nyog ashley koj kuaj lub david kua phev.  Kuaj ntsws ashley mob khees xaws: Yog koj haus luam yeeb felix sim no los yog tau haus yav tas los uas muaj hnub nyoog nruab 49 xyoos mus ashley 79 xyoo, nug koj pab pawg saib xyuas devonte puas tsim nyog ashley koj yeej meem kuaj lub ntsws ashley mob khees xaws.    Ashley cov james phiaj tanmay siv ua ntaub ntawv qhia nkaus xwb. Yuav tsis pauv tanmay qhia los ntawm koj qhov chaw mckenna mob. Copyright (nataliya quevedo)   2023 [facility].   Txhua txoj emy raug tswj tseg lawm. Tshaj xyuas los ntawm [facility]. [email or web address] [id number] - REV 04/24.

## 2025-05-08 NOTE — NURSING NOTE
Prior to immunization administration, verified patients identity using patient s name and date of birth. Please see Immunization Activity for additional information.     Screening Questionnaire for Adult Immunization    Are you sick today?   No   Do you have allergies to medications, food, a vaccine component or latex?   No   Have you ever had a serious reaction after receiving a vaccination?   No   Do you have a long-term health problem with heart, lung, kidney, or metabolic disease (e.g., diabetes), asthma, a blood disorder, no spleen, complement component deficiency, a cochlear implant, or a spinal fluid leak?  Are you on long-term aspirin therapy?   No   Do you have cancer, leukemia, HIV/AIDS, or any other immune system problem?   No   Do you have a parent, brother, or sister with an immune system problem?   No   In the past 3 months, have you taken medications that affect  your immune system, such as prednisone, other steroids, or anticancer drugs; drugs for the treatment of rheumatoid arthritis, Crohn s disease, or psoriasis; or have you had radiation treatments?   No   Have you had a seizure, or a brain or other nervous system problem?   No   During the past year, have you received a transfusion of blood or blood    products, or been given immune (gamma) globulin or antiviral drug?   No   For women: Are you pregnant or is there a chance you could become       pregnant during the next month?   No   Have you received any vaccinations in the past 4 weeks?   No     Immunization questionnaire answers were all negative.      Patient instructed to remain in clinic for 15 minutes afterwards, and to report any adverse reactions.     Screening performed by BJORN Craft on 5/8/2025 at 3:02 PM.

## 2025-05-08 NOTE — TELEPHONE ENCOUNTER
Patient Quality Outreach    Patient is due for the following:   Breast Cancer Screening - Mammogram    Action(s) Taken:   I have patient sechdule for the mammo bus in July     Type of outreach:    Schedule after the appointment from today's appointment     Questions for provider review:    None         Marielena Sánchez  Chart routed to None.

## 2025-05-12 ENCOUNTER — RESULTS FOLLOW-UP (OUTPATIENT)
Dept: OBGYN | Facility: CLINIC | Age: 55
End: 2025-05-12

## 2025-05-21 LAB — HEMOCCULT STL QL IA: NEGATIVE

## 2025-07-02 ENCOUNTER — ANCILLARY PROCEDURE (OUTPATIENT)
Facility: CLINIC | Age: 55
End: 2025-07-02
Attending: FAMILY MEDICINE
Payer: COMMERCIAL

## 2025-07-02 DIAGNOSIS — Z12.31 ENCOUNTER FOR SCREENING MAMMOGRAM FOR BREAST CANCER: ICD-10-CM

## 2025-07-02 PROCEDURE — 77067 SCR MAMMO BI INCL CAD: CPT | Mod: TC | Performed by: RADIOLOGY

## 2025-07-02 PROCEDURE — 77063 BREAST TOMOSYNTHESIS BI: CPT | Mod: TC | Performed by: RADIOLOGY
